# Patient Record
Sex: MALE | Race: WHITE | Employment: FULL TIME | URBAN - METROPOLITAN AREA
[De-identification: names, ages, dates, MRNs, and addresses within clinical notes are randomized per-mention and may not be internally consistent; named-entity substitution may affect disease eponyms.]

---

## 2018-12-05 ENCOUNTER — HOSPITAL ENCOUNTER (OUTPATIENT)
Dept: PREADMISSION TESTING | Age: 58
Discharge: HOME | End: 2018-12-05
Payer: COMMERCIAL

## 2018-12-05 DIAGNOSIS — Z01.818 PREOP EXAMINATION: ICD-10-CM

## 2018-12-05 LAB
ALBUMIN SERPL-MCNC: 3.6 G/DL (ref 3.4–5)
ALBUMIN/GLOB SERPL: 0.8 {RATIO} (ref 0.8–1.7)
ALP SERPL-CCNC: 268 U/L (ref 45–117)
ALT SERPL-CCNC: 103 U/L (ref 16–61)
ANION GAP SERPL CALC-SCNC: 6 MMOL/L (ref 3–18)
APPEARANCE UR: CLEAR
AST SERPL-CCNC: 89 U/L (ref 15–37)
BILIRUB SERPL-MCNC: 0.6 MG/DL (ref 0.2–1)
BILIRUB UR QL: NEGATIVE
BUN SERPL-MCNC: 19 MG/DL (ref 7–18)
BUN/CREAT SERPL: 34
CALCIUM SERPL-MCNC: 8.8 MG/DL (ref 8.5–10.1)
CHLORIDE SERPL-SCNC: 102 MMOL/L (ref 100–108)
CO2 SERPL-SCNC: 29 MMOL/L (ref 21–32)
COLOR UR: YELLOW
CREAT SERPL-MCNC: 0.56 MG/DL (ref 0.6–1.3)
ERYTHROCYTE [DISTWIDTH] IN BLOOD BY AUTOMATED COUNT: 12.7 % (ref 11.6–14.5)
GLOBULIN SER CALC-MCNC: 4.3 G/DL (ref 2–4)
GLUCOSE SERPL-MCNC: 140 MG/DL (ref 74–99)
GLUCOSE UR STRIP.AUTO-MCNC: NEGATIVE MG/DL
HCT VFR BLD AUTO: 44.9 % (ref 36–48)
HGB BLD-MCNC: 15.5 G/DL (ref 13–16)
HGB UR QL STRIP: NEGATIVE
KETONES UR QL STRIP.AUTO: NEGATIVE MG/DL
LEUKOCYTE ESTERASE UR QL STRIP.AUTO: NEGATIVE
MCH RBC QN AUTO: 31.1 PG (ref 24–34)
MCHC RBC AUTO-ENTMCNC: 34.5 G/DL (ref 31–37)
MCV RBC AUTO: 90.2 FL (ref 74–97)
NITRITE UR QL STRIP.AUTO: NEGATIVE
PH UR STRIP: 6 [PH] (ref 5–8)
PLATELET # BLD AUTO: 283 K/UL (ref 135–420)
PMV BLD AUTO: 8.8 FL (ref 9.2–11.8)
POTASSIUM SERPL-SCNC: 4.9 MMOL/L (ref 3.5–5.5)
PROT SERPL-MCNC: 7.9 G/DL (ref 6.4–8.2)
PROT UR STRIP-MCNC: NEGATIVE MG/DL
RBC # BLD AUTO: 4.98 M/UL (ref 4.7–5.5)
SODIUM SERPL-SCNC: 137 MMOL/L (ref 136–145)
SP GR UR REFRACTOMETRY: 1.02 (ref 1–1.03)
UROBILINOGEN UR QL STRIP.AUTO: 0.2 EU/DL (ref 0.2–1)
WBC # BLD AUTO: 5 K/UL (ref 4.6–13.2)

## 2018-12-05 PROCEDURE — 93005 ELECTROCARDIOGRAM TRACING: CPT

## 2018-12-05 PROCEDURE — 36415 COLL VENOUS BLD VENIPUNCTURE: CPT

## 2018-12-05 PROCEDURE — 80053 COMPREHEN METABOLIC PANEL: CPT

## 2018-12-05 PROCEDURE — 81003 URINALYSIS AUTO W/O SCOPE: CPT

## 2018-12-05 PROCEDURE — 85027 COMPLETE CBC AUTOMATED: CPT

## 2018-12-06 LAB
ATRIAL RATE: 64 BPM
CALCULATED P AXIS, ECG09: 49 DEGREES
CALCULATED R AXIS, ECG10: 42 DEGREES
CALCULATED T AXIS, ECG11: 46 DEGREES
DIAGNOSIS, 93000: NORMAL
P-R INTERVAL, ECG05: 180 MS
Q-T INTERVAL, ECG07: 410 MS
QRS DURATION, ECG06: 96 MS
QTC CALCULATION (BEZET), ECG08: 422 MS
VENTRICULAR RATE, ECG03: 64 BPM

## 2018-12-26 NOTE — H&P
Patient Name:  Sahra Christiansen  [facility])  YOB: 1960      Chief Complaint:  Left knee pain. History of Chief Complaint:  Corin Yanez comes in today for evaluation of his left knee symptoms. He has had focal difficulty with range of motion. He was previously evaluated in September and was noted to have a meniscus tear with recurrent effusions about the knee which had resolved. Recently he was on a business trip and got stuck in [place] and he had significant focal swelling about the knee. He was evaluated and underwent aspirations and improved his symptoms, was not injected and he denies any associated symptoms other than the increased focal pain about the knee. Past Medical/Surgical History:    Disease/Disorder Type Date Side Surgery Date Side Comment       Knee surgery  left [facility] 08/08/2018 - per H&P 6/13/2012 patient wrote \"Left knee\" on the surgery line   Stroke              Arthroscopy knee 2005 right        Arthroscopy knee 2012 right      Allergies:    Ingredient Reaction Medication Name Comment   NO KNOWN ALLERGIES          Current Medications:    Medication Directions   Ambien 10 mg tablet    Plavix    MELOXICAM 15 MG TABLET TAKE 1 TABLET BY MOUTH EVERY DAY     Social History:    SMOKING  Status Tobacco Type Units Per Day Yrs Used   Never smoker        ALCOHOL  There is a history of alcohol use. Type: Beer. 1 beer consumed daily. Family History:    Disease Detail Family Member Age Cause of Death Comments   Family history of Cancer   N    Family history of Arthritis   N    Family history of Diabetes mellitus   N      Review of Systems:    Pertinent negatives include fever, fainting and gout.     Vitals:  Date BP Pulse Temp (F) Resp. (per min.) Height (Total in.) Weight (lbs.) BMI   08/03/2018     69.00  24.37   04/03/2015     70.00  23.67   06/13/2012 110 / 80    69.00  25.10     Physical Examination:    Psychiatric/General:  No acute distress; pleasant and accommodating. HEENT:  Moist mucous membranes intact. Lymphatic:  Neck is supple with no lymphadenopathy upon evaluation. Respiratory:   Equal bilateral chest wall movement with inspiration; no wheezes or strider audible. Cardiovascular:    Regular rate and rhythm, as noted by upper extremity pulses. Musculoskeletal: On evaluation of the left knee, he has moderate effusion with flexion reproducing symptoms, diffuse. He has pain in the posterior aspect with significant Baker's cyst also present. Gross motor is intact. No warmth or erythematous change is appreciated throughout the knee. Data/Radiograph Evaluation:    X-rays were reviewed from before and show fairly well preserved knee. MRI also notes significant medial meniscal tear. Assessment: At this point we will continue with plans for aspiration of the knee. Recommendation:  A  sample of the aspirate will be sent off for further evaluation including culture, gram stain and cell count. Suspicion for infection is low, but due to the cloudy nature of the aspirate I would like further assessment. We will repeat evaluation with recommendations to follow. We will also plan for surgical arthroscopy of left knee for medial meniscal tear. After discussion of options for treatment and the risks of the injection, the patient agreed to proceed with the injection. After sterile prep, the ultrasound transducer was placed over the medial joint line. Settings were adjusted to maximal visualization. The needle was introduced parallel and deep to the transducer from an inferolateral patellar approach under real time guidance. The intraarticular space and surrounding capsule were visualized. The medical necessity of the ultrasound is based on the need to localize the structures ensuring accuracy of placement, which should increase the efficacy and longevity of the injection.   The left knee was aspirated of approximately 60cc of turbid fluid and subsequently injected with 2cc of bupivacaine and 1cc   of 40mg of methylprednisolone. Imaging was documented and stored on the PACS . The patient tolerated the procedure without complications. Post injection pain and blood sugar elevation were discussed.     Jarrod Torrez DO

## 2018-12-26 NOTE — DISCHARGE INSTRUCTIONS
[facility]  Dr. Robinson Choi Post-Operative Instructions Knee Arthroscopy Scope    Diet:  1. Begin with liquids and light foods such as Jell-O and soups. 2. Advance as tolerated to your regular diet if not nauseated. First 24 hours:  1. Be in the care of a responsible adult. 2. Do not drive or operate machinery. 3. Do not drink alcoholic beverages. Activities:  1. Elevate the limb above hip and preferably above chest for 48 hours. 2. Ice should be applied to the knee in a waterproof bag for 15-30 minutes each hour while awake for first 48 hours. 3. Normal walking is encouraged after 2 days. 4. Do not engage in activities that increase your pain such as stair-climbing or prolonged standing. 5. Return to work depends on your type of employment. 6.  Follow-up with Dr. Vinicius Marshall in 7-10 days post-operatively. Exercise:  1. Begin exercises the day of surgery for both legs and repeat hourly while awake:  *Quad sets (tightening the thigh muscles)  *? Straight leg raises (lift and hold 12-18 off bed or floor for 8 count)  *? Vigorous ankle pumps (toes towards and away from head)  2. Your routine exercises generally can be started one week after surgery as long as you can bend the knee freely to at least 90 degrees. Wound Care:  1. Maintain your postoperative dressing. Loosen the ACE wrap if swelling of the foot or ankle occurs. 2. Remove your surgical dressing on the second post op day. Cover the wounds with Band-Aids and re-wrap the ACE bandage until swelling of knee is gone. To maintain good circulation, do not wrap too tightly. 3. Keep the surgical incisions dry until your sutures are removed when you see your doctor. Use a plastic bag with rubber bands to cover the limb during showers. Immersing the limb in water is to be avoided. Medications:  1. Strong oral narcotic pain medications have been prescribed for the first few days. Use only as directed.  No pain medication is capable of taking away all the pain. Taking your pills at regular intervals will give you the best chance of having less pain. 2. If you need a refill PLEASE PLAN AHEAD. Call our office during regular hours (8-5). 3. Do not combine with alcoholic beverages. 4. Be careful as you walk, climb stairs or drive as mild dizziness is not unusual.  5. Do not take medications that have not been prescribed by your surgeon. 6. You may switch to over the counter pain medication of your choice as you become more comfortable. WHEN TO CALL YOUR SURGEON:  1. Significant swelling or any new numbness in the limb that was operated on  2. Unrelenting pain  3. Fever or Chills  4. Redness around incisions  5. Color change in foot or toes  6. Continuous drainage or bleeding from wounds (a small amount of drainage is expected)  7. Any other worrisome condition    WHEN TO CALL YOUR REGULAR DOCTOR:  1. Flare up of any of your regular medical conditions    WHEN TO CALL 911:  1. Chest Pain  2. Shortness of Breath  3. Any other acute serious condition    CALL THE OFFICE:   If you have severe pain unrelieved by the medications;   If you have a fever of 101.0°F or greater;    If you notice excessive swelling, redness, or persistent drainage from the incision or IV site; [facility] office number is [phone] from 8:00am to 5:00pm Monday through Friday. After 5:00pm, on weekends, or holidays, please leave a message with our answering service and the doctor on-call will get back to you shortly. [employer]

## 2018-12-27 ENCOUNTER — ANESTHESIA EVENT (OUTPATIENT)
Dept: SURGERY | Age: 58
End: 2018-12-27
Payer: COMMERCIAL

## 2018-12-28 ENCOUNTER — HOSPITAL ENCOUNTER (OUTPATIENT)
Age: 58
Setting detail: OUTPATIENT SURGERY
Discharge: HOME | End: 2018-12-28
Attending: ORTHOPAEDIC SURGERY | Admitting: ORTHOPAEDIC SURGERY
Payer: COMMERCIAL

## 2018-12-28 ENCOUNTER — ANESTHESIA (OUTPATIENT)
Dept: SURGERY | Age: 58
End: 2018-12-28
Payer: COMMERCIAL

## 2018-12-28 VITALS
SYSTOLIC BLOOD PRESSURE: 120 MMHG | BODY MASS INDEX: 24.96 KG/M2 | DIASTOLIC BLOOD PRESSURE: 86 MMHG | OXYGEN SATURATION: 96 % | TEMPERATURE: 98 F | HEIGHT: 69 IN | RESPIRATION RATE: 16 BRPM | WEIGHT: 168.5 LBS | HEART RATE: 82 BPM

## 2018-12-28 DIAGNOSIS — S83.8X2A ACUTE MEDIAL MENISCAL INJURY OF LEFT KNEE, INITIAL ENCOUNTER: Primary | ICD-10-CM

## 2018-12-28 PROCEDURE — 74011250636 HC RX REV CODE- 250/636

## 2018-12-28 PROCEDURE — 77030002933 HC SUT MCRYL J&J -A: Performed by: ORTHOPAEDIC SURGERY

## 2018-12-28 PROCEDURE — 74011250636 HC RX REV CODE- 250/636: Performed by: ORTHOPAEDIC SURGERY

## 2018-12-28 PROCEDURE — 77030036563 HC WRP CLD THER KNE S2SG -B: Performed by: ORTHOPAEDIC SURGERY

## 2018-12-28 PROCEDURE — 77030018835 HC SOL IRR LR ICUM -A: Performed by: ORTHOPAEDIC SURGERY

## 2018-12-28 PROCEDURE — 77030012508 HC MSK AIRWY LMA AMBU -A: Performed by: ANESTHESIOLOGY

## 2018-12-28 PROCEDURE — 76210000006 HC OR PH I REC 0.5 TO 1 HR: Performed by: ORTHOPAEDIC SURGERY

## 2018-12-28 PROCEDURE — 76060000032 HC ANESTHESIA 0.5 TO 1 HR: Performed by: ORTHOPAEDIC SURGERY

## 2018-12-28 PROCEDURE — 77030020782 HC GWN BAIR PAWS FLX 3M -B: Performed by: ORTHOPAEDIC SURGERY

## 2018-12-28 PROCEDURE — 74011000250 HC RX REV CODE- 250

## 2018-12-28 PROCEDURE — 77030032490 HC SLV COMPR SCD KNE COVD -B: Performed by: ORTHOPAEDIC SURGERY

## 2018-12-28 PROCEDURE — 74011250637 HC RX REV CODE- 250/637: Performed by: ANESTHESIOLOGY

## 2018-12-28 PROCEDURE — 76210000021 HC REC RM PH II 0.5 TO 1 HR: Performed by: ORTHOPAEDIC SURGERY

## 2018-12-28 PROCEDURE — 76010000138 HC OR TIME 0.5 TO 1 HR: Performed by: ORTHOPAEDIC SURGERY

## 2018-12-28 PROCEDURE — 77030034478 HC TU IRR ARTHRO PT ARTH -B: Performed by: ORTHOPAEDIC SURGERY

## 2018-12-28 PROCEDURE — 77030022877 HC TU IRR ARTHRO PMP ARTH -B: Performed by: ORTHOPAEDIC SURGERY

## 2018-12-28 RX ORDER — NALOXONE HYDROCHLORIDE 0.4 MG/ML
0.4 INJECTION, SOLUTION INTRAMUSCULAR; INTRAVENOUS; SUBCUTANEOUS AS NEEDED
Status: DISCONTINUED | OUTPATIENT
Start: 2018-12-28 | End: 2018-12-28 | Stop reason: HOSPADM

## 2018-12-28 RX ORDER — HYDROCODONE BITARTRATE AND ACETAMINOPHEN 5; 325 MG/1; MG/1
1 TABLET ORAL AS NEEDED
Status: DISCONTINUED | OUTPATIENT
Start: 2018-12-28 | End: 2018-12-28 | Stop reason: HOSPADM

## 2018-12-28 RX ORDER — FENTANYL CITRATE 50 UG/ML
INJECTION, SOLUTION INTRAMUSCULAR; INTRAVENOUS AS NEEDED
Status: DISCONTINUED | OUTPATIENT
Start: 2018-12-28 | End: 2018-12-28 | Stop reason: HOSPADM

## 2018-12-28 RX ORDER — FENTANYL CITRATE 50 UG/ML
INJECTION, SOLUTION INTRAMUSCULAR; INTRAVENOUS
Status: COMPLETED
Start: 2018-12-28 | End: 2018-12-28

## 2018-12-28 RX ORDER — FENTANYL CITRATE 50 UG/ML
50 INJECTION, SOLUTION INTRAMUSCULAR; INTRAVENOUS
Status: DISCONTINUED | OUTPATIENT
Start: 2018-12-28 | End: 2018-12-28 | Stop reason: HOSPADM

## 2018-12-28 RX ORDER — FLUMAZENIL 0.1 MG/ML
0.2 INJECTION INTRAVENOUS
Status: DISCONTINUED | OUTPATIENT
Start: 2018-12-28 | End: 2018-12-28 | Stop reason: HOSPADM

## 2018-12-28 RX ORDER — ONDANSETRON 2 MG/ML
INJECTION INTRAMUSCULAR; INTRAVENOUS AS NEEDED
Status: DISCONTINUED | OUTPATIENT
Start: 2018-12-28 | End: 2018-12-28 | Stop reason: HOSPADM

## 2018-12-28 RX ORDER — PROPOFOL 10 MG/ML
INJECTION, EMULSION INTRAVENOUS AS NEEDED
Status: DISCONTINUED | OUTPATIENT
Start: 2018-12-28 | End: 2018-12-28 | Stop reason: HOSPADM

## 2018-12-28 RX ORDER — SODIUM CHLORIDE 0.9 % (FLUSH) 0.9 %
5-10 SYRINGE (ML) INJECTION EVERY 8 HOURS
Status: DISCONTINUED | OUTPATIENT
Start: 2018-12-28 | End: 2018-12-28 | Stop reason: HOSPADM

## 2018-12-28 RX ORDER — BISMUTH SUBSALICYLATE 262 MG
1 TABLET,CHEWABLE ORAL DAILY
COMMUNITY

## 2018-12-28 RX ORDER — GLYCOPYRROLATE 0.2 MG/ML
INJECTION INTRAMUSCULAR; INTRAVENOUS AS NEEDED
Status: DISCONTINUED | OUTPATIENT
Start: 2018-12-28 | End: 2018-12-28 | Stop reason: HOSPADM

## 2018-12-28 RX ORDER — MIDAZOLAM HYDROCHLORIDE 1 MG/ML
INJECTION, SOLUTION INTRAMUSCULAR; INTRAVENOUS AS NEEDED
Status: DISCONTINUED | OUTPATIENT
Start: 2018-12-28 | End: 2018-12-28 | Stop reason: HOSPADM

## 2018-12-28 RX ORDER — KETOROLAC TROMETHAMINE 30 MG/ML
INJECTION, SOLUTION INTRAMUSCULAR; INTRAVENOUS AS NEEDED
Status: DISCONTINUED | OUTPATIENT
Start: 2018-12-28 | End: 2018-12-28 | Stop reason: HOSPADM

## 2018-12-28 RX ORDER — DEXAMETHASONE SODIUM PHOSPHATE 4 MG/ML
INJECTION, SOLUTION INTRA-ARTICULAR; INTRALESIONAL; INTRAMUSCULAR; INTRAVENOUS; SOFT TISSUE AS NEEDED
Status: DISCONTINUED | OUTPATIENT
Start: 2018-12-28 | End: 2018-12-28 | Stop reason: HOSPADM

## 2018-12-28 RX ORDER — LIDOCAINE HYDROCHLORIDE 20 MG/ML
INJECTION, SOLUTION EPIDURAL; INFILTRATION; INTRACAUDAL; PERINEURAL AS NEEDED
Status: DISCONTINUED | OUTPATIENT
Start: 2018-12-28 | End: 2018-12-28 | Stop reason: HOSPADM

## 2018-12-28 RX ORDER — HYDROCODONE BITARTRATE AND ACETAMINOPHEN 5; 325 MG/1; MG/1
1 TABLET ORAL
Qty: 30 TAB | Refills: 0 | Status: SHIPPED | OUTPATIENT
Start: 2018-12-28

## 2018-12-28 RX ORDER — SODIUM CHLORIDE 0.9 % (FLUSH) 0.9 %
5-10 SYRINGE (ML) INJECTION AS NEEDED
Status: DISCONTINUED | OUTPATIENT
Start: 2018-12-28 | End: 2018-12-28 | Stop reason: HOSPADM

## 2018-12-28 RX ORDER — SODIUM CHLORIDE, SODIUM LACTATE, POTASSIUM CHLORIDE, CALCIUM CHLORIDE 600; 310; 30; 20 MG/100ML; MG/100ML; MG/100ML; MG/100ML
125 INJECTION, SOLUTION INTRAVENOUS CONTINUOUS
Status: DISCONTINUED | OUTPATIENT
Start: 2018-12-28 | End: 2018-12-28 | Stop reason: HOSPADM

## 2018-12-28 RX ORDER — CEFAZOLIN SODIUM/WATER 2 G/20 ML
2 SYRINGE (ML) INTRAVENOUS ONCE
Status: COMPLETED | OUTPATIENT
Start: 2018-12-28 | End: 2018-12-28

## 2018-12-28 RX ADMIN — Medication 2 G: at 09:03

## 2018-12-28 RX ADMIN — DEXAMETHASONE SODIUM PHOSPHATE 4 MG: 4 INJECTION, SOLUTION INTRA-ARTICULAR; INTRALESIONAL; INTRAMUSCULAR; INTRAVENOUS; SOFT TISSUE at 09:27

## 2018-12-28 RX ADMIN — FENTANYL CITRATE 25 MCG: 50 INJECTION, SOLUTION INTRAMUSCULAR; INTRAVENOUS at 09:13

## 2018-12-28 RX ADMIN — FENTANYL CITRATE 25 MCG: 50 INJECTION, SOLUTION INTRAMUSCULAR; INTRAVENOUS at 08:53

## 2018-12-28 RX ADMIN — HYDROCODONE BITARTRATE AND ACETAMINOPHEN 1 TABLET: 5; 325 TABLET ORAL at 11:07

## 2018-12-28 RX ADMIN — LIDOCAINE HYDROCHLORIDE 100 MG: 20 INJECTION, SOLUTION EPIDURAL; INFILTRATION; INTRACAUDAL; PERINEURAL at 09:02

## 2018-12-28 RX ADMIN — PROPOFOL 200 MG: 10 INJECTION, EMULSION INTRAVENOUS at 09:02

## 2018-12-28 RX ADMIN — FENTANYL CITRATE 50 MCG: 50 INJECTION, SOLUTION INTRAMUSCULAR; INTRAVENOUS at 10:08

## 2018-12-28 RX ADMIN — FENTANYL CITRATE 50 MCG: 50 INJECTION, SOLUTION INTRAMUSCULAR; INTRAVENOUS at 10:18

## 2018-12-28 RX ADMIN — GLYCOPYRROLATE 0.2 MG: 0.2 INJECTION INTRAMUSCULAR; INTRAVENOUS at 08:53

## 2018-12-28 RX ADMIN — SODIUM CHLORIDE, SODIUM LACTATE, POTASSIUM CHLORIDE, AND CALCIUM CHLORIDE 125 ML/HR: 600; 310; 30; 20 INJECTION, SOLUTION INTRAVENOUS at 08:03

## 2018-12-28 RX ADMIN — FENTANYL CITRATE 50 MCG: 50 INJECTION, SOLUTION INTRAMUSCULAR; INTRAVENOUS at 09:02

## 2018-12-28 RX ADMIN — MIDAZOLAM HYDROCHLORIDE 2 MG: 1 INJECTION, SOLUTION INTRAMUSCULAR; INTRAVENOUS at 08:53

## 2018-12-28 RX ADMIN — KETOROLAC TROMETHAMINE 30 MG: 30 INJECTION, SOLUTION INTRAMUSCULAR; INTRAVENOUS at 09:27

## 2018-12-28 RX ADMIN — ONDANSETRON 4 MG: 2 INJECTION INTRAMUSCULAR; INTRAVENOUS at 09:27

## 2018-12-28 NOTE — ANESTHESIA PREPROCEDURE EVALUATION
Anesthetic History Pertinent negatives: No PONV Review of Systems / Medical History Patient summary reviewed, nursing notes reviewed and pertinent labs reviewed Pulmonary Pertinent negatives: No asthma, sleep apnea and smoker Neuro/Psych  
 
 
CVA (2013, no recurrence): no residual symptoms TIA Cardiovascular Pertinent negatives: No hypertension, past MI, dysrhythmias and angina Exercise tolerance: >4 METS 
  
GI/Hepatic/Renal 
  
 
 
 
 
Pertinent negatives: No GERD Endo/Other Arthritis Pertinent negatives: No diabetes and obesity Other Findings Physical Exam 
 
Airway Mallampati: III 
TM Distance: 4 - 6 cm Neck ROM: normal range of motion Mouth opening: Normal 
 
 Cardiovascular Rhythm: regular Rate: normal 
 
 
 
 Dental 
No notable dental hx Pulmonary Breath sounds clear to auscultation Abdominal 
GI exam deferred Other Findings Anesthetic Plan ASA: 2 Anesthesia type: general 
 
 
 
 
Induction: Intravenous Anesthetic plan and risks discussed with: Patient Plan GA/LMA. Pt understands risks and agrees to proceed.

## 2018-12-28 NOTE — OP NOTES
OPERATIVE NOTE    Patient: April Acosta MRN: [id number]  SSN: [id number]    YOB: 1960  Age: 60 y.o. Sex: male      Indications: This is a 60y.o. year-old male who presents with knee pain. He was positive for meniscal tear on MRI. The patient was admitted for surgery as conservative measures have failed. Date of Procedure: 12/28/2018     Preoperative Diagnosis: LEFT KNEE MEDIAL MENISCAL TEAR    Postoperative Diagnosis: LEFT KNEE MEDIAL MENISCAL TEAR      Procedure: Procedure(s):  LEFT KNEE ARTHROSCOPY/PARTIAL MEDIAL MENISECTOMY    Surgeon(s) and Role:     * Mayuri Palumbo, DO - Primary    Anesthesia: general LMA    Estimated Blood Loss: 3 ml  IVF 1000 ml LR    Specimens: * No specimens in log *     Drains: none    Implants: * No implants in log *    Complications: None; patient tolerated the procedure well. Procedure: The patient was greeted by anesthesia and taken to the operative suite, where she underwent general endotracheal anesthesia. The patient was positioned in the supine position on a standard orthopedic table. The left leg was secured with a surgical assist leg acosta and sterilely prepped and draped in standard fashion. Standard inferomedial and inferolateral portals were made with a 15-blade scalpel. A 30-degree arthroscope was placed through the lateral portal into the suprapatellar  Pouch. Evaluation of the patellofemoral compartment showed grade 1 changes of the cartilage surface. Evaluation of the medial compartment showed grade 1 changes of the cartilgenus surface. The anterior and posterior cruciate ligament were found to be intact. Grade 1 changes were noted over the lateral compartment. The menesci were evaluated and probed. The medial compartment demonstrated a horizontal cleavage tear. The lateral compartment demonstrated a horizontal cleavage tear.   Arthroscopic meniscectomy was performed with a shaver where appropriated until no loose or unstable cartilage remained upon probing. .  Additional procedures included anterior synovectomy. The knee was irrigated with the remainder of the arthroscopic lavage and injected with 30 ml of .25% Marcaine with Epinephrine and 4 mg of Morphine sulfate. The incisions were reapproximated with 3-0 Monocryl suture in horizontal subcutaneous mattress fashion x 2. A soft sterile dressing and an ace wrap were placed. The patient recovered from anesthesia and was transferred to the post-anesthesia care unit in stable condition.

## 2018-12-28 NOTE — ANESTHESIA POSTPROCEDURE EVALUATION
Post-Anesthesia Evaluation and Assessment Cardiovascular Function/Vital Signs Visit Vitals /80 Pulse 84 Temp 37.3 °C (99.1 °F) Resp 23 Ht 5' 9\" (1.753 m) Wt 76.4 kg (168 lb 8 oz) SpO2 96% BMI 24.88 kg/m² Patient is status post Procedure(s): LEFT KNEE ARTHROSCOPY/PARTIAL MEDIAL MENISECTOMY. Nausea/Vomiting: Controlled. Postoperative hydration reviewed and adequate. Pain: 
Pain Scale 1: Numeric (0 - 10) (12/28/18 1019) Pain Intensity 1: 3 (12/28/18 1019) Managed. Neurological Status:  
Neuro (WDL): Within Defined Limits (12/28/18 6181) At baseline. Mental Status and Level of Consciousness: Arousable. Pulmonary Status:  
O2 Device: Room air (12/28/18 1000) Adequate oxygenation and airway patent. Complications related to anesthesia: None Post-anesthesia assessment completed. No concerns. Patient has met all discharge requirements. Signed By: Toan Thakkar MD  
 December 28, 2018

## 2018-12-28 NOTE — INTERVAL H&P NOTE
H&P Update: 
Abigail Valenzuela was seen and examined. History and physical has been reviewed. The patient has been examined. There have been no significant clinical changes since the completion of the originally dated History and Physical. 
Patient identified by surgeon; surgical site was confirmed by patient and surgeon. Plan for left knee arthroscopy Signed By: Nikolas Borja DO   
 December 28, 2018 8:44 AM

## 2018-12-28 NOTE — BRIEF OP NOTE
BRIEF OPERATIVE NOTE Date of Procedure: 12/28/2018 Preoperative Diagnosis: LEFT KNEE MEDIAL MENISCAL TEAR Postoperative Diagnosis: LEFT KNEE MEDIAL MENISCAL TEAR Procedure(s): LEFT KNEE ARTHROSCOPY/PARTIAL MEDIAL MENISECTOMY Surgeon(s) and Role: Ruben Gill, DO - Primary Surgical Assistant: maru 
 
Surgical Staff: 
Circ-1: Za Olivre RN 
Circ-2: [place] Ascencio RN Scrub RN-1: Hernesto Armstrong RN Surg Asst-1: [facility] Event Time In Time Out Incision Start 3918 Incision Close 0930 Anesthesia: General  
Estimated Blood Loss: 5 ml IVF 1000 ml LR Specimens: * No specimens in log * Findings: medial meniscal tear and synovitis Complications: none Implants: * No implants in log *

## (undated) DEVICE — UNDERCAST PADDING: Brand: DEROYAL

## (undated) DEVICE — (D)PREP SKN CHLRAPRP APPL 26ML -- CONVERT TO ITEM 371833

## (undated) DEVICE — GOWN,SIRUS,NONRNF,SETINSLV,2XL,18/CS: Brand: MEDLINE

## (undated) DEVICE — MASTISOL ADHESIVE LIQ 2/3ML

## (undated) DEVICE — SUT MONOCRYL PLUS UD 3-0 --

## (undated) DEVICE — SOLUTION IRRIG 3000ML LAC R FLX CONT

## (undated) DEVICE — KENDALL SCD EXPRESS SLEEVES, KNEE LENGTH, MEDIUM: Brand: KENDALL SCD

## (undated) DEVICE — STERILE POLYISOPRENE POWDER-FREE SURGICAL GLOVES WITH EMOLLIENT COATING: Brand: PROTEXIS

## (undated) DEVICE — STERILE POLYISOPRENE POWDER-FREE SURGICAL GLOVES: Brand: PROTEXIS

## (undated) DEVICE — TUBING PMP L8FT LNG W/ CONN FOR AR-6400 REDEUCE

## (undated) DEVICE — PACK PROCEDURE SURG KNEE ARTHSCP CUST

## (undated) DEVICE — (D)STRIP SKN CLSR 0.5X4IN WHT --

## (undated) DEVICE — TUBE IRRIG L8IN LNG PT W/ CONN FOR PMP SYS REDEUCE